# Patient Record
Sex: FEMALE | Race: WHITE
[De-identification: names, ages, dates, MRNs, and addresses within clinical notes are randomized per-mention and may not be internally consistent; named-entity substitution may affect disease eponyms.]

---

## 2017-10-23 DIAGNOSIS — N92.6 IRREGULAR MENSES: ICD-10-CM

## 2017-10-23 NOTE — TELEPHONE ENCOUNTER
Patient requesting 90 day supply.    drospirenone-ethinyl estradiol (CORY) 3-0.02 MG per tablet      Last Written Prescription Date: 11/18/16  Last Fill Quantity: 84,  # refills: 3   Last Office Visit with CHRISTINE, ANTONELLA or [facility] prescribing provider: 11/18/16                                             BILLIE Upton

## 2017-10-23 NOTE — LETTER
October 25, 2017      Diamond Fajardo  [address]  [address]  [facility] [address]              Dear Diamond,      We recently received a call from your pharmacy requesting a refill of your medication. We have provided a 60 day refill of your medication, drospirenone-ethinyl estradiol (CORY) 3-0.02 MG per tablet, as requested.      A review of your chart indicates that your last office visit was on 11/18/2016.  An appointment is required yearly with your provider.    Please call the clinic to schedule your appointment.    Thank you for taking an active role in your healthcare.    Sincerely,  Haley Monsivais RN,   [facility]  Geovanna Boateng CNP

## 2017-10-25 RX ORDER — DROSPIRENONE AND ETHINYL ESTRADIOL 0.02-3(28)
1 KIT ORAL DAILY
Qty: 56 TABLET | Refills: 0 | Status: SHIPPED | OUTPATIENT
Start: 2017-10-25 | End: 2017-12-18

## 2017-10-25 NOTE — TELEPHONE ENCOUNTER
Refilled per [facility] protocol. Letter sent to patient indicating upcoming annual visit.     Haley Monsivais RN

## 2017-12-18 DIAGNOSIS — N92.6 IRREGULAR MENSES: ICD-10-CM

## 2017-12-18 NOTE — LETTER
December 19, 2017      Diamond Fajardo  [address]  [address]  [facility] [address]              Dear Diamond,      We recently received a call from your pharmacy requesting a refill of your medication. We have provided a 30 day refill of your medication, drospirenone-ethinyl estradiol (CORY) 3-0.02 MG per tablet, as requested.      A review of your chart indicates that your last office visit was on 11/18/2016.  An appointment is required yearly with your provider.    We have authorized one time 30 day refill of your medication to allow time for you to schedule.     Please call the clinic to schedule your appointment.    Thank you for taking an active role in your healthcare.      Sincerely,    [facility]

## 2017-12-18 NOTE — TELEPHONE ENCOUNTER
drospirenone-ethinyl estradiol (CORY) 3-0.02 MG per tablet      Last Written Prescription Date: 10/25/17  Last Fill Quantity: 56,  # refills: 0   Last Office Visit with [facility], P or [facility] prescribing provider: 11/18/16              No future appointments scheduled.    BILLIE Upton

## 2017-12-19 RX ORDER — DROSPIRENONE AND ETHINYL ESTRADIOL 0.02-3(28)
1 KIT ORAL DAILY
Qty: 28 TABLET | Refills: 0 | Status: SHIPPED | OUTPATIENT
Start: 2017-12-19

## 2018-10-15 ENCOUNTER — HEALTH MAINTENANCE LETTER (OUTPATIENT)
Age: 23
End: 2018-10-15

## 2018-11-12 ENCOUNTER — HEALTH MAINTENANCE LETTER (OUTPATIENT)
Age: 23
End: 2018-11-12

## 2018-11-19 ENCOUNTER — HEALTH MAINTENANCE LETTER (OUTPATIENT)
Age: 23
End: 2018-11-19

## 2020-03-01 ENCOUNTER — HEALTH MAINTENANCE LETTER (OUTPATIENT)
Age: 25
End: 2020-03-01

## 2020-12-14 ENCOUNTER — HEALTH MAINTENANCE LETTER (OUTPATIENT)
Age: 25
End: 2020-12-14

## 2021-04-17 ENCOUNTER — HEALTH MAINTENANCE LETTER (OUTPATIENT)
Age: 26
End: 2021-04-17

## 2021-10-02 ENCOUNTER — HEALTH MAINTENANCE LETTER (OUTPATIENT)
Age: 26
End: 2021-10-02

## 2022-05-08 ENCOUNTER — HEALTH MAINTENANCE LETTER (OUTPATIENT)
Age: 27
End: 2022-05-08

## 2023-01-14 ENCOUNTER — HEALTH MAINTENANCE LETTER (OUTPATIENT)
Age: 28
End: 2023-01-14

## 2023-06-02 ENCOUNTER — HEALTH MAINTENANCE LETTER (OUTPATIENT)
Age: 28
End: 2023-06-02